# Patient Record
Sex: FEMALE | Race: OTHER | NOT HISPANIC OR LATINO
[De-identification: names, ages, dates, MRNs, and addresses within clinical notes are randomized per-mention and may not be internally consistent; named-entity substitution may affect disease eponyms.]

---

## 2022-11-04 ENCOUNTER — NON-APPOINTMENT (OUTPATIENT)
Age: 67
End: 2022-11-04

## 2022-11-19 ENCOUNTER — NON-APPOINTMENT (OUTPATIENT)
Age: 67
End: 2022-11-19

## 2022-11-30 ENCOUNTER — APPOINTMENT (OUTPATIENT)
Dept: UROLOGY | Facility: CLINIC | Age: 67
End: 2022-11-30

## 2022-11-30 ENCOUNTER — NON-APPOINTMENT (OUTPATIENT)
Age: 67
End: 2022-11-30

## 2022-11-30 VITALS
HEART RATE: 86 BPM | SYSTOLIC BLOOD PRESSURE: 142 MMHG | DIASTOLIC BLOOD PRESSURE: 89 MMHG | TEMPERATURE: 97.7 F | HEIGHT: 62 IN | OXYGEN SATURATION: 97 % | BODY MASS INDEX: 25.76 KG/M2 | RESPIRATION RATE: 16 BRPM | WEIGHT: 140 LBS

## 2022-11-30 DIAGNOSIS — Z80.42 FAMILY HISTORY OF MALIGNANT NEOPLASM OF PROSTATE: ICD-10-CM

## 2022-11-30 DIAGNOSIS — R31.29 OTHER MICROSCOPIC HEMATURIA: ICD-10-CM

## 2022-11-30 DIAGNOSIS — Z00.00 ENCOUNTER FOR GENERAL ADULT MEDICAL EXAMINATION W/OUT ABNORMAL FINDINGS: ICD-10-CM

## 2022-11-30 DIAGNOSIS — Z80.1 FAMILY HISTORY OF MALIGNANT NEOPLASM OF TRACHEA, BRONCHUS AND LUNG: ICD-10-CM

## 2022-11-30 PROCEDURE — 99204 OFFICE O/P NEW MOD 45 MIN: CPT

## 2022-12-01 LAB
APPEARANCE: CLEAR
BACTERIA: NEGATIVE
BILIRUB UR QL STRIP: NORMAL
BILIRUBIN URINE: NEGATIVE
BLOOD URINE: ABNORMAL
CLARITY UR: CLEAR
COLLECTION METHOD: NORMAL
COLOR: NORMAL
GLUCOSE QUALITATIVE U: NEGATIVE
GLUCOSE UR-MCNC: NORMAL
HCG UR QL: 0.2 EU/DL
HGB UR QL STRIP.AUTO: NORMAL
HYALINE CASTS: 0 /LPF
KETONES UR-MCNC: NORMAL
KETONES URINE: NEGATIVE
LEUKOCYTE ESTERASE UR QL STRIP: NORMAL
LEUKOCYTE ESTERASE URINE: NEGATIVE
MICROSCOPIC-UA: NORMAL
NITRITE UR QL STRIP: NORMAL
NITRITE URINE: NEGATIVE
PH UR STRIP: 7.5
PH URINE: 7.5
PROT UR STRIP-MCNC: NORMAL
PROTEIN URINE: NEGATIVE
RED BLOOD CELLS URINE: 10 /HPF
SP GR UR STRIP: 1.02
SPECIFIC GRAVITY URINE: 1.01
SQUAMOUS EPITHELIAL CELLS: 0 /HPF
UROBILINOGEN URINE: NORMAL
WHITE BLOOD CELLS URINE: 0 /HPF

## 2022-12-01 NOTE — ADDENDUM
[FreeTextEntry1] : A portion of this note was written by [Herminia Ching] on 11/30/2022  acting as a scribe for Dr. Aguilar. \par \par I have personally reviewed the chart and agree that the record accurately reflects my personal performance and the history, physical exam, assessment, and plan.\par

## 2022-12-01 NOTE — LETTER BODY
[Dear  ___] : Dear  [unfilled], [Consult Letter:] : I had the pleasure of evaluating your patient, [unfilled]. [Please see my note below.] : Please see my note below. [Consult Closing:] : Thank you very much for allowing me to participate in the care of this patient.  If you have any questions, please do not hesitate to contact me. [Sincerely,] : Sincerely, [FreeTextEntry3] : Dr. Xena Aguilar\par

## 2022-12-01 NOTE — ASSESSMENT
[FreeTextEntry1] : Pt is a 66 yo F  who presents today with asymptomatic, intermittent microscopic hematuria, referred by Dr. Radha De La Fuente. Pt reports that she does not see blood in her urine. Several UAs have shown microheme - UA from 10/14/2021 showed 8 RBC's/HPF, UA from 10/27/2021 showed 2 RBC's/HPF, UA from 22 showed 3 RBC's/HPF. \par \par Pt underwent an CT of the abdomen & pelvis with oral and IV contrast on 22 that showed no renal stones or masses, thickening of the gallbladder wall, and a fibroid uterus. Pt also reports that she has a cyst on one of her ovaries that is followed yearly, and a hx of gallstones. \par \par She is a former smoker - she smoked 1/3 ppd for 30 years. \par \par Given her hx of microscopic hematuria and hx of smoking, Pt will return for a cystoscopy. \par \par Urine was sent for UA and culture. \par \par Patient expressed understanding.\par \par

## 2022-12-01 NOTE — HISTORY OF PRESENT ILLNESS
[FreeTextEntry1] : Pt is a 66 yo F  who presents today with asymptomatic, intermittent microscopic hematuria, referred by Dr. Radha De La Fuente. Pt reports that she does not see blood in her urine. Several UAs have shown microheme - UA from 10/14/2021 showed 8 RBC's/HPF, UA from 10/27/2021 showed 2 RBC's/HPF, UA from 22 showed 3 RBC's/HPF. She denies experiencing any urinary symptoms today and denies experiencing a sensation of prolapse. \par \par Pt underwent an CT of the abdomen & pelvis with oral and IV contrast on 22 that showed no renal stones or masses, thickening of the gallbladder wall, and a fibroid uterus. Pt also reports that she has a cyst on one of her ovaries that is followed yearly, and a hx of gallstones. \par \par Pt reports that both her mother and brother have a hx of microscopic hematuria.\par \par She sees an endocrinologist for a nontoxic nodular goiter, and is expected to undergo a biopsy for this soon. \par \par Udip: (+) small blood \par \par PMH: HTN, high cholesterol, sleep apnea (uses CPAP machine), osteoporosis, nontoxic nodular goiter, diverticulosis, HSV\par PSH: breast FNA benign (), thyroid FNA benign ()\par FH: prostate CA (brother) HTN (mother), high cholesterol (mother), heart disease (mother), blood clots (sibling), breast CA (mother), colon CA (mother), lung CA (father), \par SH: former smoker (quit in 2017, 1/3 ppd/30 years), 3-4 drinks/week, single, semi-retired /grants coordinator\par \par Allergies: NKDA\par \par Meds: olmesartan, valacyclovir (Pt also provided list of supplements she takes, available in her chart)

## 2022-12-03 ENCOUNTER — TRANSCRIPTION ENCOUNTER (OUTPATIENT)
Age: 67
End: 2022-12-03

## 2022-12-15 ENCOUNTER — APPOINTMENT (OUTPATIENT)
Dept: UROLOGY | Facility: CLINIC | Age: 67
End: 2022-12-15

## 2022-12-15 VITALS
HEART RATE: 76 BPM | DIASTOLIC BLOOD PRESSURE: 79 MMHG | SYSTOLIC BLOOD PRESSURE: 128 MMHG | OXYGEN SATURATION: 98 % | TEMPERATURE: 97.6 F

## 2022-12-15 PROCEDURE — 52000 CYSTOURETHROSCOPY: CPT

## 2022-12-15 PROCEDURE — 81002 URINALYSIS NONAUTO W/O SCOPE: CPT

## 2022-12-20 NOTE — ASSESSMENT
[FreeTextEntry1] : Pt is a 66 yo F  with asymptomatic, intermittent microscopic hematuria. Pt is a former smoker - she smoked 1/3 ppd/30 years. CT scan from 22 showed no renal masses, thickening of the gallbladder wall, and a fibroid uterus. UA from 22 showed 10 RBC's/HPF. She presents today for a cystoscopy. \par \par Today's cystoscopy was normal.\par \par Given her hx of microscopic hematuria, she will return for surveillance UA in 1 year. \par \par Urine was sent for culture. \par \par Patient expressed understanding. Detail Level: Simple Render Risk Assessment In Note?: no Comment: Mild to moderate inflammatory acne \\nDiscussed many lesions excoriated but lesions today would favor acne \\nDiscussed the condition at length.\\nDiscussed winlevi and adapalene v spironolactone and adapalene.  Discussed potential risks and side effects.  Patient prefers spironolactone and adapalene Comment: Patient describes “bumps” in her scalp.  Discussed clinically no evidence of these lesions. Today.  Discussed clinically more consistent with seb derm.  Will have start treatment and monitor if new lesions appear will reevaluate

## 2022-12-20 NOTE — ADDENDUM
[FreeTextEntry1] : A portion of this note was written by [Herminia Ching] on 12/15/2022  acting as a scribe for Dr. Aguilar. \par \par I have personally reviewed the chart and agree that the record accurately reflects my personal performance and the history, physical exam, assessment, and plan.\par

## 2022-12-20 NOTE — HISTORY OF PRESENT ILLNESS
[FreeTextEntry1] : Pt is a 68 yo F  with asymptomatic, intermittent microscopic hematuria. Pt is a former smoker - she smoked 1/3 ppd/30 years. CT scan from 22 showed no renal masses, thickening of the gallbladder wall, and a fibroid uterus. UA from 22 showed 10 RBC's/HPF. She presents today for a cystoscopy. \par \par Udip: (+) small blood \par \par 12/15/22-- Pt is a 68 yo F  who presents today with asymptomatic, intermittent microscopic hematuria, referred by Dr. Radha De La Fuente. Pt reports that she does not see blood in her urine. Several UAs have shown microheme - UA from 10/14/2021 showed 8 RBC's/HPF, UA from 10/27/2021 showed 2 RBC's/HPF, UA from 22 showed 3 RBC's/HPF. She denies experiencing any urinary symptoms today and denies experiencing a sensation of prolapse. \par \par Pt underwent an CT of the abdomen & pelvis with oral and IV contrast on 22 that showed no renal stones or masses, thickening of the gallbladder wall, and a fibroid uterus. Pt also reports that she has a cyst on one of her ovaries that is followed yearly, and a hx of gallstones. \par \par Pt reports that both her mother and brother have a hx of microscopic hematuria.\par \par She sees an endocrinologist for a nontoxic nodular goiter, and is expected to undergo a biopsy for this soon. 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

## 2022-12-21 LAB
BACTERIA UR CULT: NORMAL
BACTERIA UR CULT: NORMAL
BILIRUB UR QL STRIP: NORMAL
CLARITY UR: CLEAR
COLLECTION METHOD: NORMAL
GLUCOSE UR-MCNC: NORMAL
HCG UR QL: 0.2 EU/DL
HGB UR QL STRIP.AUTO: NORMAL
KETONES UR-MCNC: NORMAL
LEUKOCYTE ESTERASE UR QL STRIP: NORMAL
NITRITE UR QL STRIP: NORMAL
PH UR STRIP: 5.5
PROT UR STRIP-MCNC: NORMAL
SP GR UR STRIP: <1.005

## 2024-01-01 ENCOUNTER — NON-APPOINTMENT (OUTPATIENT)
Age: 69
End: 2024-01-01

## 2024-05-13 ENCOUNTER — NON-APPOINTMENT (OUTPATIENT)
Age: 69
End: 2024-05-13

## 2024-05-14 ENCOUNTER — APPOINTMENT (OUTPATIENT)
Dept: OTOLARYNGOLOGY | Facility: CLINIC | Age: 69
End: 2024-05-14
Payer: MEDICARE

## 2024-05-14 VITALS — WEIGHT: 139 LBS | HEIGHT: 61 IN | BODY MASS INDEX: 26.24 KG/M2

## 2024-05-14 DIAGNOSIS — Z78.9 OTHER SPECIFIED HEALTH STATUS: ICD-10-CM

## 2024-05-14 DIAGNOSIS — H61.21 IMPACTED CERUMEN, RIGHT EAR: ICD-10-CM

## 2024-05-14 DIAGNOSIS — J31.0 CHRONIC RHINITIS: ICD-10-CM

## 2024-05-14 DIAGNOSIS — Z80.3 FAMILY HISTORY OF MALIGNANT NEOPLASM OF BREAST: ICD-10-CM

## 2024-05-14 DIAGNOSIS — Z82.3 FAMILY HISTORY OF STROKE: ICD-10-CM

## 2024-05-14 DIAGNOSIS — Z86.19 PERSONAL HISTORY OF OTHER INFECTIOUS AND PARASITIC DISEASES: ICD-10-CM

## 2024-05-14 DIAGNOSIS — J34.3 HYPERTROPHY OF NASAL TURBINATES: ICD-10-CM

## 2024-05-14 DIAGNOSIS — J34.2 DEVIATED NASAL SEPTUM: ICD-10-CM

## 2024-05-14 DIAGNOSIS — Z82.49 FAMILY HISTORY OF ISCHEMIC HEART DISEASE AND OTHER DISEASES OF THE CIRCULATORY SYSTEM: ICD-10-CM

## 2024-05-14 DIAGNOSIS — Z86.79 PERSONAL HISTORY OF OTHER DISEASES OF THE CIRCULATORY SYSTEM: ICD-10-CM

## 2024-05-14 DIAGNOSIS — K21.9 GASTRO-ESOPHAGEAL REFLUX DISEASE W/OUT ESOPHAGITIS: ICD-10-CM

## 2024-05-14 DIAGNOSIS — Z86.39 PERSONAL HISTORY OF OTHER ENDOCRINE, NUTRITIONAL AND METABOLIC DISEASE: ICD-10-CM

## 2024-05-14 DIAGNOSIS — M95.0 ACQUIRED DEFORMITY OF NOSE: ICD-10-CM

## 2024-05-14 DIAGNOSIS — Z87.01 PERSONAL HISTORY OF PNEUMONIA (RECURRENT): ICD-10-CM

## 2024-05-14 PROCEDURE — 99203 OFFICE O/P NEW LOW 30 MIN: CPT | Mod: 25

## 2024-05-14 PROCEDURE — 31231 NASAL ENDOSCOPY DX: CPT

## 2024-05-14 PROCEDURE — 69210 REMOVE IMPACTED EAR WAX UNI: CPT

## 2024-05-14 RX ORDER — OLMESARTAN MEDOXOMIL 5 MG/1
5 TABLET, FILM COATED ORAL
Refills: 0 | Status: ACTIVE | COMMUNITY

## 2024-05-14 RX ORDER — VALACYCLOVIR 500 MG/1
TABLET, FILM COATED ORAL
Refills: 0 | Status: ACTIVE | COMMUNITY

## 2024-05-14 NOTE — ASSESSMENT
[FreeTextEntry1] : She has a history of obstructive sleep apnea.  Sleep study in 2019 showed a strong positional component.  She was normal when she was nonsupine.  She has been trying to sleep on her side.  However, she does have nasal obstruction on the right side.  On exam, she has a deviated septum to the right, inferior turbinate hypertrophy, and nasal valve collapse.  She has laryngeal changes consistent with reflux.  That may contribute to the nasal symptoms in the throat tickling  She had cerumen impaction on the right side.  This is likely causing the ear pressure  Plan -Findings and management options were discussed with the patient. - When she returns, I will see if she would like to have a hearing test.  We will ensure that the ear pressure has resolved - I recommended trial nasal saline irrigations, nasal steroid spray, and nasal cones - She may consider nasal procedures to improve her breathing.  We discussed septoplasty, inferior turbinate reduction, and nasal valve repair in the operating room.  She could also consider septoplasty and inferior turbinate reduction in the operating room followed by nasal valve remodeling in the office.  She is going to think this over - She has a follow-up sleep study pending at the sleep lab - She may speak with her dentist to see if she is a candidate for an oral appliance - I recommended reflux precautions.  She was given literature.  She may try taking Pepcid at night to see if that helps -I will see her back after her sleep study if she is doing well.  If she is interested in any procedures, she will return earlier

## 2024-05-14 NOTE — PHYSICAL EXAM
[TextEntry] : PHYSICAL EXAM  General: The patient was alert, oriented and in no distress. Voice was clear.  Face: The patient had no facial asymmetry or mass. The skin was unremarkable.  Ears: Hearing normal to conversational voice External ears were normal without deformity. External ear canals: AS- normal. no cerumen or inflammation. AD- cerumen impaction Tympanic membranes: AS- intact. no perforation or effusion  PROCEDURE- EAR MICROSCOPY AND CERUMEN REMOVAL from right ear  Indication: cerumen removal Under the microscope, obstructing cerumen was removed atraumatically from the right ear with a suction, curette and/or forceps.  Canal: normal. no inflammation.  Tympanic membrane: Intact. no perforation or effusion.  Nose:  The external nose had no significant deformity.   . On anterior rhinoscopy, the nasal mucosa was clear.  The anterior septum was deviated to the right. There were no visualized polyps, purulence  or masses.  Aaron maneuver was positive for nasal valve collapse  Oral cavity: Oral mucosa- normal. Oral and base of tongue- clear and without mass. Gingival and buccal mucosa- moist and without lesions. Palate- the palate moved well. There was no cleft palate. There appeared to be good salivary flow.   Oral cavity/oropharynx- no pus, erythema or mass S/p tonsillectomy  Neck:  The neck was symmetrical. The parotid and submandibular glands were normal without masses. The trachea was midline and there was no unusual crepitus. Thyroid was smooth and nontender and no masses were palpated. No masses  Lymphatics: Cervical adenopathy- none.

## 2024-05-14 NOTE — HISTORY OF PRESENT ILLNESS
[de-identified] : ARABELLA CAMARENA is a 69 year old patient With a history of chronic rhinitis and positional obstructive sleep apnea.  She was last seen here in 2018.  She has chronic nasal congestion obstruction which is worse on the right side.  It typically occurs when she sleeps.  She says it is worse if she has her head to the left.  She tried nasal saline which may have helped a little bit.  She tried Breathe Right strips of the irritated the skin.  She also has occasional tickling sensation in her throat.  She also has right ear pressure.  She has a history of positional obstructive sleep apnea.  Home sleep study done in 2019 showed an THOMAS of 31.2 in the supine position but 1.7 in the nonsupine position.  Her lowest oxygen saturation was 85%.  She is followed at the sleep center at [place].  She has a history of a deviated septum  She had findings on my previous exam consistent with reflux on flexible laryngoscopy.  She did have an episode of epigastric pain in 2021 or 2022.  She had a workup for it.  She was on medication for a time.  She is not currently on medication now.

## 2024-05-14 NOTE — CONSULT LETTER
[Dear  ___] : Dear  [unfilled], [Courtesy Letter:] : I had the pleasure of seeing your patient, [unfilled], in my office today. [Please see my note below.] : Please see my note below. [Consult Closing:] : Thank you very much for allowing me to participate in the care of this patient.  If you have any questions, please do not hesitate to contact me. [Sincerely,] : Sincerely, [FreeTextEntry3] : Leilani Porter MD [facility]  Head & Neck Surgery [facility]   Department of Otolaryngology [facility] [employer] [facility]  Office Tel: [phone]